# Patient Record
Sex: FEMALE | Race: OTHER | NOT HISPANIC OR LATINO | URBAN - METROPOLITAN AREA
[De-identification: names, ages, dates, MRNs, and addresses within clinical notes are randomized per-mention and may not be internally consistent; named-entity substitution may affect disease eponyms.]

---

## 2024-02-18 ENCOUNTER — EMERGENCY (EMERGENCY)
Facility: HOSPITAL | Age: 24
LOS: 1 days | Discharge: HOME | End: 2024-02-18
Admitting: STUDENT IN AN ORGANIZED HEALTH CARE EDUCATION/TRAINING PROGRAM
Payer: SELF-PAY

## 2024-02-18 VITALS
OXYGEN SATURATION: 98 % | DIASTOLIC BLOOD PRESSURE: 62 MMHG | SYSTOLIC BLOOD PRESSURE: 95 MMHG | RESPIRATION RATE: 18 BRPM | HEART RATE: 67 BPM | TEMPERATURE: 98 F

## 2024-02-18 PROCEDURE — 93010 ELECTROCARDIOGRAM REPORT: CPT

## 2024-02-18 PROCEDURE — 99284 EMERGENCY DEPT VISIT MOD MDM: CPT

## 2024-02-18 RX ORDER — ACETAMINOPHEN 500 MG
975 TABLET ORAL ONCE
Refills: 0 | Status: COMPLETED | OUTPATIENT
Start: 2024-02-18 | End: 2024-02-18

## 2024-02-18 RX ORDER — SODIUM CHLORIDE 9 MG/ML
1000 INJECTION INTRAMUSCULAR; INTRAVENOUS; SUBCUTANEOUS ONCE
Refills: 0 | Status: COMPLETED | OUTPATIENT
Start: 2024-02-18 | End: 2024-02-18

## 2024-02-18 RX ORDER — KETOROLAC TROMETHAMINE 30 MG/ML
15 SYRINGE (ML) INJECTION ONCE
Refills: 0 | Status: DISCONTINUED | OUTPATIENT
Start: 2024-02-18 | End: 2024-02-18

## 2024-02-18 NOTE — ED PROVIDER NOTE - CLINICAL SUMMARY MEDICAL DECISION MAKING FREE TEXT BOX
23 y/o female pmh migraines c/o headache x 1 week. Pt recently went through a break up with her boyfriend of 4 years as he had to move back to [place] for an arranged marriage. Pt is very upset and has been crying all week. Pt now c/o headache and neck pain similar to previous migraines. Pt denies SI or HI. Denies taking any OTC meds. Pt is well appearing but tearful on exam, afebrile, no neuro deficits, - low concern for intracranial pathology, pt is not suicidal, pt is upset about her current situation but is having an appropriate response, will refer to crisis center if she feels she needs help or needs to talk to someone, will treat pain in ER and dc.

## 2024-02-18 NOTE — ED PROVIDER NOTE - PATIENT PORTAL LINK FT
You can access the FollowMyHealth Patient Portal offered by [facility] by registering at the following website: [email or web address]. By joining [employer]’s FollowMyHealth portal, you will also be able to view your health information using other applications (apps) compatible with our system.

## 2024-02-18 NOTE — ED PROVIDER NOTE - NSFOLLOWUPINSTRUCTIONS_ED_ALL_ED_FT
Acute Headache    WHAT YOU NEED TO KNOW:    What is an acute headache? An acute headache is pain or discomfort that may start suddenly and get worse quickly. You may have an acute headache only when you feel stress or eat certain foods. Other acute headache pain can happen every day, and sometimes several times a day.    What are the most common types of acute headache?    Tension headache is the most common type of headache. These headaches typically occur in the late afternoon and go away by evening. The pain is usually mild or moderate. You may have problems tolerating bright light or loud noise. The pain is usually across the forehead or in the back of the head, often only on one side. These headaches may occur every day.    Migraine headaches cause moderate or severe pain. The headache generally lasts from 1 to 3 days and tends to come back. Pain is usually on only one side, but it may change sides. Migraines often occur in the temple, the back of the head, or behind the eye. The pain may throb or be sharp and steady.    A migraine with aura means you see or feel something before a migraine. You may see a small spot surrounded by bright zigzag lines. Other signs or symptoms may follow the aura.    Cluster headache pain is usually only on one side. It often causes severe pain, and can last for 30 minutes to 2 hours. These headaches may occur 1 or 2 times each day, more often at night. The pain may wake you.  What causes acute headaches? The cause of your headache may not be known. The following can trigger a headache:    Stress or tension, hours or even days after stressful events    Fatigue, a lack of sleep or changes in your usual sleep pattern, or a nap during the day    Menstruation, especially after pregnancy, or use of birth control pills or hormone replacement therapy    Food such as cured meats, artificial sweeteners, alcohol, dark chocolate, and MSG    Suddenly not having caffeine if you usually have larger amounts    A medical problem, such as an infection, tooth pain, neck or sinus pain, thyroid problems, or a tumor    A head injury  How is the type of acute headache diagnosed and treated? Your healthcare provider will ask you to describe your pain and rate it on a scale from 1 to 10. Tell the provider how often you have headaches and how long they last. Also describe any other symptoms you have along with headaches, such as dizziness or blurred vision. You may need tests including a CT scan to make sure there is not a leak in any blood vessels.    Medicines may be given to manage or prevent headaches. The medicine will depend on the type of acute headache you have. Do not wait until the pain is severe before you take your medicine. You may be able to take over-the-counter pain medicines as needed. Examples include NSAIDs and acetaminophen. Ask your healthcare provider which medicine is right for you. Ask how much to take and when to take it. Follow directions. These medicines can cause stomach bleeding or kidney or liver damage if not taken correctly.    Biofeedback may be used to help you manage stress. Electrodes (wires) are placed on your body and attached to a monitor. You will learn how to change stress reactions. For example, you learn to slow your heart rate when you become upset.    Cognitive behavior therapy, or stress management, may be used with other therapies to prevent headaches.  What can I do to manage my symptoms?    Apply heat or ice on the headache area. Use a heat or ice pack. For an ice pack, you can also put crushed ice in a plastic bag. Cover the pack or bag with a towel before you apply it to your skin. Ice and heat both help decrease pain, and heat also helps decrease muscle spasms. Apply heat for 20 to 30 minutes every 2 hours. Apply ice for 15 to 20 minutes every hour. Apply heat or ice for as long and for as many days as directed. You may alternate heat and ice.    Relax your muscles. Lie down in a comfortable position and close your eyes. Relax your muscles slowly. Start at your toes and work your way up your body.    Keep a record of your headaches. Write down when your headaches start and stop. Include your symptoms and what you were doing when the headache began. Record what you ate or drank for 24 hours before the headache started. Describe the pain and where it hurts. Keep track of what you did to treat your headache and if it worked.  What can I do to prevent an acute headache?    Avoid anything that triggers an acute headache. Examples include exposure to chemicals, going to high altitude, or not getting enough sleep. Create a regular sleep routine. Go to sleep at the same time and wake up at the same time each day. Do not use electronic devices before bedtime. These may trigger a headache or prevent you from sleeping well.    Do not smoke. Nicotine and other chemicals in cigarettes and cigars can trigger an acute headache or make it worse. Ask your healthcare provider for information if you currently smoke and need help to quit. E-cigarettes or smokeless tobacco still contain nicotine. Talk to your healthcare provider before you use these products.    Limit alcohol as directed. Alcohol can trigger an acute headache or make it worse. If you have cluster headaches, do not drink alcohol during an episode. For other types of headaches, ask your healthcare provider if it is safe for you to drink alcohol. Ask how much is safe for you to drink, and how often.    Exercise as directed. Exercise can reduce tension and help with headache pain. Aim for 30 minutes of physical activity on most days of the week. Your healthcare provider can help you create an exercise plan.    Eat a variety of healthy foods. Healthy foods include fruits, vegetables, low-fat dairy products, lean meats, fish, whole grains, and cooked beans. Your healthcare provider or dietitian can help you create meals plans if you need to avoid foods that trigger headaches.  When should I seek immediate care?    You have severe pain.    You have numbness or weakness on one side of your face or body.    You have a headache that occurs after a blow to the head, a fall, or other trauma.    You have a headache, are forgetful or confused, or have trouble speaking.    You have a headache, stiff neck, and a fever.  When should I call my doctor?    You have a constant headache and are vomiting.    You have a headache each day that does not get better, even after treatment.    You have changes in your headaches, or new symptoms that occur when you have a headache.    You have questions or concerns about your condition or care.  CARE AGREEMENT:    You have the right to help plan your care. Learn about your health condition and how it may be treated. Discuss treatment options with your healthcare providers to decide what care you want to receive. You always have the right to refuse treatment.

## 2024-02-18 NOTE — ED ADULT TRIAGE NOTE - CHIEF COMPLAINT QUOTE
Pt with history of Migraine is c/o headache on and off since a week ago, c/o loss of appetite, generalized weakness and SOB. Denies chest pain, fever or photophobia or nausea or vomiting. Has no other past medical problems

## 2024-02-18 NOTE — ED PROVIDER NOTE - OBJECTIVE STATEMENT
23 y/o female pmh migraines c/o headache. Pt states that this past week her boyfriend of 4 years ended their relationship to go back to [facility] to have an arranged marriage. Pt has been very upset since, crying a lot and not eating or drinking much. Pt now c/o headache and neck pain similar to her previous migraines but has not taken any OTC meds. Pt denies SI or HI but admits to feeling very sad. Denies chest pain, sob, abd pain, n/v/d, numbness, tingling, weakness, dizziness, syncope, fever or chills.

## 2024-02-19 ENCOUNTER — EMERGENCY (EMERGENCY)
Facility: HOSPITAL | Age: 24
LOS: 1 days | Discharge: HOME | End: 2024-02-19
Attending: EMERGENCY MEDICINE | Admitting: EMERGENCY MEDICINE
Payer: SELF-PAY

## 2024-02-19 VITALS
DIASTOLIC BLOOD PRESSURE: 67 MMHG | RESPIRATION RATE: 15 BRPM | HEART RATE: 67 BPM | TEMPERATURE: 98 F | SYSTOLIC BLOOD PRESSURE: 98 MMHG | OXYGEN SATURATION: 99 %

## 2024-02-19 VITALS
OXYGEN SATURATION: 100 % | HEART RATE: 58 BPM | SYSTOLIC BLOOD PRESSURE: 101 MMHG | DIASTOLIC BLOOD PRESSURE: 59 MMHG | TEMPERATURE: 98 F | RESPIRATION RATE: 16 BRPM

## 2024-02-19 PROCEDURE — 99284 EMERGENCY DEPT VISIT MOD MDM: CPT

## 2024-02-19 RX ADMIN — SODIUM CHLORIDE 1000 MILLILITER(S): 9 INJECTION INTRAMUSCULAR; INTRAVENOUS; SUBCUTANEOUS at 01:01

## 2024-02-19 RX ADMIN — Medication 975 MILLIGRAM(S): at 01:01

## 2024-02-19 RX ADMIN — Medication 15 MILLIGRAM(S): at 01:01

## 2024-02-19 RX ADMIN — Medication 0.5 MILLIGRAM(S): at 12:18

## 2024-02-19 NOTE — ED PROVIDER NOTE - CLINICAL SUMMARY MEDICAL DECISION MAKING FREE TEXT BOX
24 F seen yesterday with medical workup for headache and weakness.  Patient noted increased stress and depression symptoms at that time.  Now returns seeking help for insomnia, anxiety, depression.  No SI/HI.  Does not seek admission.  Crisis care closed today due to presence day weekend.  Will give oral anxiolytic and encourage close follow-up.  Discharged with family member.

## 2024-02-19 NOTE — ED ADULT NURSE NOTE - CHIEF COMPLAINT QUOTE
Pt arrives ambulatory to triage c/o increased stress x1 wk d/t recent break-up with partner of 4 yrs after finding out he's . Denies S/I, H/I, hallucinations, substance use. + difficulty sleeping & eating. Seen at [facility] medically yesterday, told to f/u with psychiatry today. Denies PMHx. Mother Janki [phone].

## 2024-02-19 NOTE — ED PROVIDER NOTE - PATIENT PORTAL LINK FT
You can access the FollowMyHealth Patient Portal offered by [facility] by registering at the following website: [email or web address]. By joining [employer]’s FollowMyHealth portal, you will also be able to view your health information using other applications (apps) compatible with our system.

## 2024-02-19 NOTE — ED ADULT NURSE NOTE - NSFALLUNIVINTERV_ED_ALL_ED
Bed/Stretcher in lowest position, wheels locked, appropriate side rails in place/Call bell, personal items and telephone in reach/Instruct patient to call for assistance before getting out of bed/chair/stretcher/Non-slip footwear applied when patient is off stretcher/[place] to call system/Physically safe environment - no spills, clutter or unnecessary equipment/Purposeful proactive rounding/Room/bathroom lighting operational, light cord in reach

## 2024-02-19 NOTE — ED ADULT NURSE NOTE - OBJECTIVE STATEMENT
pt received to intake. pt is AxO 3 ans ambulatory. pt came in as instructed to follow up with the psychologist after being seen in [facility] ER yesterday. pt endorses being under an increased amount of distress over the passed week. pt denies SI, HI, and denies the use of drugs/ETOH. Respirations even and unlabored. pt denies headaches, dizziness, n/v/d, abdominal pain, SOB, chest pain, or fever like symptoms. pt medicated as prescribed. plan of care ongoing.

## 2024-02-19 NOTE — ED PROVIDER NOTE - OBJECTIVE STATEMENT
24 F seen yesterday due to generalized weakness and headache.  Patient with history of migraine but stated that this headache was worse than usual.  Patient also had decreased p.o. intake, increased stress due to breaking up with boyfriend, and generalized weakness.  Patient had labs, fluids and medication and left feeling better with no headache.  Patient now returns with no new symptoms.  Did have headache this morning but took Tylenol with improvement.  No fever/chills no N/V no abdominal or back pain.  Patient has worsened anxiety and stress.  Slept poorly for the entire week.  Has decreased p.o. intake for the entire week.  Patient is visiting from [place], and was told by physician to see a psychiatrist.  Denies SI/HI.  No hallucinations/delusion.  No history of prior anxiety or depression.  No previous prescribed medication.  Patient does not feel she needs to be admitted.  But seeks referral or medication to improve her anxiety and sleeping.

## 2024-02-19 NOTE — ED ADULT NURSE NOTE - OBJECTIVE STATEMENT
Received pt in room int12. A&Ox4, ambulatory at baseline, HX migraines c/o headache, generalized weakness, decreased PO intake due to life stressors. states head/neck pain similar to migraines, denies meds takes. Pt denies SI or HI but admits to feeling very sad. Denies chest pain, sob, abd pain, n/v/d, numbness, tingling, weakness, dizziness, syncope, fever or chill  VS as noted. RR even and unlabored. 20g placed in right AC. Labs sent. Medication given. Awaiting further orders from provider.

## 2024-02-19 NOTE — ED ADULT TRIAGE NOTE - CHIEF COMPLAINT QUOTE
Pt arrives ambulatory to triage c/o increased stress x1 wk d/t recent break-up with partner of 4 yrs after finding out he's . Denies S/I, H/I, hallucinations, substance use. + difficulty sleeping & eating. Seen at [facility] medically yesterday, told to f/u with psychiatry today. Denies PMHx. Mother Janki [phone].

## 2024-02-19 NOTE — ED ADULT NURSE NOTE - NSFALLUNIVINTERV_ED_ALL_ED
Bed/Stretcher in lowest position, wheels locked, appropriate side rails in place/Call bell, personal items and telephone in reach/Instruct patient to call for assistance before getting out of bed/chair/stretcher/Non-slip footwear applied when patient is off stretcher/[place] to call system/Physically safe environment - no spills, clutter or unnecessary equipment/Purposeful proactive rounding/Room/bathroom lighting operational, light cord in reach

## 2024-02-19 NOTE — ED PROVIDER NOTE - NSFOLLOWUPINSTRUCTIONS_ED_ALL_ED_FT
You were evaluated in the emergency department for anxiety and depression.    You did not have symptoms that required admission to the hospital.    You received oral Ativan as anxiety medication and to improve your difficulty sleeping.    You may need further psychiatric evaluation or psychology or therapy intervention.    Seek care at the walk-in crisis center, referral given.    [phone].    Follow-up with your medical doctor or return to emergency for worsening pain, fever, weakness, numbness, vomiting, dizziness, falls, fainting, thoughts of hurting self or others or any signs of distress.

## 2024-02-21 ENCOUNTER — OUTPATIENT (OUTPATIENT)
Dept: OUTPATIENT SERVICES | Facility: HOSPITAL | Age: 24
LOS: 1 days | Discharge: STILL ADMITTED OR ADMINISTRATIVE | End: 2024-02-21
Payer: SELF-PAY

## 2024-02-21 PROBLEM — Z78.9 OTHER SPECIFIED HEALTH STATUS: Chronic | Status: ACTIVE | Noted: 2024-02-18

## 2024-02-21 PROCEDURE — 90839 PSYTX CRISIS INITIAL 60 MIN: CPT

## 2024-02-22 DIAGNOSIS — F43.0 ACUTE STRESS REACTION: ICD-10-CM
